# Patient Record
Sex: FEMALE | Race: WHITE | NOT HISPANIC OR LATINO | URBAN - METROPOLITAN AREA
[De-identification: names, ages, dates, MRNs, and addresses within clinical notes are randomized per-mention and may not be internally consistent; named-entity substitution may affect disease eponyms.]

---

## 2023-10-23 ENCOUNTER — TELEPHONE (OUTPATIENT)
Dept: DERMATOLOGY | Facility: CLINIC | Age: 53
End: 2023-10-23

## 2023-10-23 DIAGNOSIS — L30.0 NUMMULAR DERMATITIS: Primary | ICD-10-CM

## 2023-10-23 RX ORDER — TRIAMCINOLONE ACETONIDE 1 MG/G
CREAM TOPICAL
Qty: 45 G | Refills: 0 | Status: SHIPPED | OUTPATIENT
Start: 2023-10-23

## 2023-10-23 NOTE — TELEPHONE ENCOUNTER
Pt called office at this time stating that she has not heard anything regarding her medication from July.  She states pharmacy is stating that there is nothing they can do. Upon chart review it shows that medication was denied insurance coverage.  This nurse called patient to update at this time. Pt is requesting a different medication be sent in at this time. Can you please review chart for appropriate substitute?    Thank you!    JACQUELIN Moreau LPN

## 2023-10-23 NOTE — PROGRESS NOTES
Patient called requesting to be able to fill Cloderm. Rx was denied by insurance. Patient does not have steroid allergy documented, but prefers cloderm. Patient willing to try substitute tCS. Rx for traimcinolone sent.

## 2023-10-23 NOTE — TELEPHONE ENCOUNTER
Pt was notified that new prescription was sent to pharmacy of choice. No further questions noted.

## 2024-06-20 ENCOUNTER — TELEPHONE (OUTPATIENT)
Dept: DERMATOLOGY | Facility: CLINIC | Age: 54
End: 2024-06-20
Payer: COMMERCIAL

## 2024-06-20 NOTE — TELEPHONE ENCOUNTER
Pt called and stated that she has a rash on her face and her medication is not working. Pt was scheduled for an appointment on Tuesday 06/25/2024 for evaluation of facial rash.    Helder Moreau LPN

## 2024-06-25 ENCOUNTER — APPOINTMENT (OUTPATIENT)
Dept: DERMATOLOGY | Facility: CLINIC | Age: 54
End: 2024-06-25
Payer: COMMERCIAL

## 2024-06-25 DIAGNOSIS — L20.89 OTHER ATOPIC DERMATITIS: ICD-10-CM

## 2024-06-25 DIAGNOSIS — L24.89 IRRITANT CONTACT DERMATITIS DUE TO OTHER AGENTS: Primary | ICD-10-CM

## 2024-06-25 PROCEDURE — 99214 OFFICE O/P EST MOD 30 MIN: CPT | Performed by: DERMATOLOGY

## 2024-06-25 RX ORDER — CLOCORTOLONE PIVALATE 0 G/G
CREAM TOPICAL
Qty: 90 G | Refills: 2 | Status: SHIPPED | OUTPATIENT
Start: 2024-06-25

## 2024-06-25 RX ORDER — PIMECROLIMUS 10 MG/G
CREAM TOPICAL
Qty: 30 G | Refills: 2 | Status: SHIPPED | OUTPATIENT
Start: 2024-06-25

## 2024-06-25 RX ORDER — HYDROCORTISONE VALERATE CREAM 2 MG/G
CREAM TOPICAL
Qty: 45 G | Refills: 0 | Status: SHIPPED | OUTPATIENT
Start: 2024-06-25

## 2024-06-25 NOTE — PROGRESS NOTES
Subjective Gertrude E Wilms is a 53 y.o. female who presents for the following: Rash (Face rash x 1 week, redness/dry- pt states has been using aveeno lotion. ) and Eczema (Left cheek, posterior upper legs/buttocks- for many years. Pt states she thinks she's using triamcinolone with no response. ).     Review of Systems:  No other skin or systemic complaints other than what is documented elsewhere in the note.    The following portions of the chart were reviewed this encounter and updated as appropriate:   Allergies  Meds  Problems  Med Hx  Surg Hx  Fam Hx         Skin Cancer History  No skin cancer on file.      Specialty Problems    None       Objective   Well appearing patient in no apparent distress; mood and affect are within normal limits.    A focused skin examination was performed. All findings within normal limits unless otherwise noted below.    Assessment/Plan   1. Irritant contact dermatitis due to other agents  Head - Anterior (Face)  Erythematous papules on background irritation    -Discussed nature of condition  -Likely due to aerosolized compound; patient was around burning prior to onset  -Recommend to start westcort cream to the face twice daily for 1-2 weeks; then discontinue  -Then transition to pimecrolimus if needed    Related Medications  hydrocortisone (West-Kevin) 0.2 % cream  Apply to the face twice daily for 1-2 weeks, then discontinue    2. Other atopic dermatitis  Erythematous, eczematous patch(es)/plaque(s) with xerotic scale on the face, extremities and trunk    Flaring  -Patient reports triamcinolone is not helpful, wishes to go back to clocortoclone  -recommend to restart clocortolone to active areas of eczema twice daily  -Once improved, discontinue tCS and start pimecrolimus once or twice daily to eczema prone areas to prevent recurrence and for areas on the face    -Discussed with/information given to the patient on the risks, benefits and alternatives of the usage of  topical corticosteroids, including but not limited to: atrophy (thinning of the skin), striae (stretch marks), telangiectasia (blood vessel growth), and dyspigmentation (discoloration of the skin).  -Recommend to limit long-term use of topical corticosteroids to less than 14 days per month to reduce risk of side effects.  Reported adverse reactions of topical calcineurin inhibitors include, but are not limited to, burning, itching, rash or infection at the application site. Rare adverse effects include viral infections or allergic reactions. There is a black-box association with potential risk for lymphoma; however, large studies have shown this medication is safe for children and adults for maintenance use for skin conditions as steroid-sparing therapy.      Related Medications  hydrocortisone (West-Kevin) 0.2 % cream  Apply to the face twice daily for 1-2 weeks, then discontinue    pimecrolimus (Elidel) 1 % cream  Apply to affected areas prone to eczema once or twice daily for maintenance to prevent recurrence.    clocortolone pivalate 0.1 % cream  Apply to affected areas of eczema twice daily when active as needed. Use less than 14 days per month.        Follow up in 2-3 months for eczema  Discussed if there are any changes or development of concerning symptoms (lesion/skin condition is changing, bleeding, enlarging, or worsening) the patient is to contact my office. The patient verbalizes understanding.    Caitie Wilson MD  6/25/2024

## 2024-10-07 ENCOUNTER — APPOINTMENT (OUTPATIENT)
Dept: DERMATOLOGY | Facility: CLINIC | Age: 54
End: 2024-10-07
Payer: COMMERCIAL

## 2024-10-07 DIAGNOSIS — Z80.8 FAMILY HISTORY OF SKIN CANCER: ICD-10-CM

## 2024-10-07 DIAGNOSIS — L20.89 OTHER ATOPIC DERMATITIS: Primary | ICD-10-CM

## 2024-10-07 DIAGNOSIS — D18.01 HEMANGIOMA OF SKIN: ICD-10-CM

## 2024-10-07 DIAGNOSIS — Z12.83 SCREENING EXAM FOR SKIN CANCER: ICD-10-CM

## 2024-10-07 DIAGNOSIS — L81.4 LENTIGO: ICD-10-CM

## 2024-10-07 DIAGNOSIS — D22.9 MULTIPLE BENIGN NEVI: ICD-10-CM

## 2024-10-07 DIAGNOSIS — L82.1 SEBORRHEIC KERATOSIS: ICD-10-CM

## 2024-10-07 PROCEDURE — 99213 OFFICE O/P EST LOW 20 MIN: CPT | Performed by: DERMATOLOGY

## 2024-10-07 NOTE — PROGRESS NOTES
Subjective Gertrude E Wilms is a 53 y.o. female who presents for the following: Skin Check (Pt presents to office for full skin exam.  Last full skin exam one year ago.  Pt has history of family NMSC.  Pt has scattered lesions of concern at this time./).     Review of Systems:  No other skin or systemic complaints other than what is documented elsewhere in the note.    The following portions of the chart were reviewed this encounter and updated as appropriate:  Allergies  Meds  Problems  Med Hx  Surg Hx  Fam Hx             Specialty Problems    None       Objective     Well appearing patient in no apparent distress; mood and affect are within normal limits.    A full examination was performed including scalp, face, neck, chest, abdomen, back, bilateral upper extremities, bilateral lower extremities. Patient declines exam underneath the shorts; patient declines exam of the lower abdomen/mons pubis, buttocks, thighs, groin, genitalia, perineal and perianal skin and these areas were not examined. The patient declines removal of the shoes/socks and the feet and lower legs were not examined.     All findings within normal limits unless otherwise noted below.    Assessment/Plan   1. Other atopic dermatitis  Clear today    Well controlled  -Continue clocortolone to any new active areas of eczema  -Continue pimecrolimus for maintenance as needed    -Patient reports triamcinolone is not helpful, wishes to go back to clocortoclone  -Discussed with/information given to the patient on the risks, benefits and alternatives of the usage of topical corticosteroids, including but not limited to: atrophy (thinning of the skin), striae (stretch marks), telangiectasia (blood vessel growth), and dyspigmentation (discoloration of the skin).  -Recommend to limit long-term use of topical corticosteroids to less than 14 days per month to reduce risk of side effects.  Reported adverse reactions of topical calcineurin inhibitors  include, but are not limited to, burning, itching, rash or infection at the application site. Rare adverse effects include viral infections or allergic reactions. There is a black-box association with potential risk for lymphoma; however, large studies have shown this medication is safe for children and adults for maintenance use for skin conditions as steroid-sparing therapy.      Related Medications  hydrocortisone (West-Kevin) 0.2 % cream  Apply to the face twice daily for 1-2 weeks, then discontinue    pimecrolimus (Elidel) 1 % cream  Apply to affected areas prone to eczema once or twice daily for maintenance to prevent recurrence.    clocortolone pivalate 0.1 % cream  Apply to affected areas of eczema twice daily when active as needed. Use less than 14 days per month.    2. Multiple benign nevi  Brown and tan macules and papules with reassuring findings on dermoscopy    -These lesions have benign, reassuring patterns on dermoscopy  -Recommend continued self observation, and to contact the office if any changes in nevi are noticed    3. Lentigo  Tan macules    -Benign appearing on exam  -Reassurance, recommend observation    4. Seborrheic keratosis  Stuck on, waxy macule(s)/papule(s)/plaque(s) with comedo-like openings and milia like cysts    -Discussed the nature of the diagnosis  -Reassurance, recommend continued observation    5. Hemangioma of skin  Cherry red papules    -Discussed the nature of the diagnosis  -Reassurance, recommend continued observation    6. Family history of skin cancer    Family history of NMSC    7. Screening exam for skin cancer        Discussed/information given on safe sun practices and use of sunscreen, sun protective clothing or sun avoidance. Recommend to use over the counter medication of sunscreen with a SPF 30 or higher on a daily basis prior to sun exposure to reduce the risk of skin cancer.    Follow up in 1 year for FSE and eczema refills  Discussed if there are any changes  or development of concerning symptoms (lesion/skin condition is changing, bleeding, enlarging, or worsening) the patient is to contact my office. The patient verbalizes understanding.     Caitie Wilson MD  10/7/2024